# Patient Record
Sex: FEMALE | Race: WHITE | URBAN - METROPOLITAN AREA
[De-identification: names, ages, dates, MRNs, and addresses within clinical notes are randomized per-mention and may not be internally consistent; named-entity substitution may affect disease eponyms.]

---

## 2019-02-14 ENCOUNTER — OFFICE VISIT (OUTPATIENT)
Dept: CARDIOLOGY CLINIC | Age: 59
End: 2019-02-14

## 2019-02-14 VITALS — SYSTOLIC BLOOD PRESSURE: 140 MMHG | WEIGHT: 187 LBS | HEART RATE: 92 BPM | DIASTOLIC BLOOD PRESSURE: 90 MMHG

## 2019-02-14 DIAGNOSIS — Z82.49 FAMILY HISTORY OF EARLY CAD: ICD-10-CM

## 2019-02-14 DIAGNOSIS — R00.2 PALPITATIONS: ICD-10-CM

## 2019-02-14 DIAGNOSIS — R53.83 FATIGUE, UNSPECIFIED TYPE: ICD-10-CM

## 2019-02-14 DIAGNOSIS — R06.02 SHORTNESS OF BREATH: ICD-10-CM

## 2019-02-14 DIAGNOSIS — R07.9 CHEST PAIN, UNSPECIFIED TYPE: Primary | ICD-10-CM

## 2019-02-14 DIAGNOSIS — E78.5 DYSLIPIDEMIA: ICD-10-CM

## 2019-02-14 DIAGNOSIS — I10 ESSENTIAL HYPERTENSION: ICD-10-CM

## 2019-02-14 DIAGNOSIS — I49.9 IRREGULAR HEART BEAT: ICD-10-CM

## 2019-02-14 RX ORDER — ESTRADIOL 1 MG/1
1 TABLET ORAL DAILY
Refills: 3 | COMMUNITY
Start: 2018-11-30

## 2019-02-14 RX ORDER — ASPIRIN 81 MG/1
81 TABLET ORAL DAILY
COMMUNITY

## 2019-02-14 RX ORDER — PANTOPRAZOLE SODIUM 40 MG/1
1 TABLET, DELAYED RELEASE ORAL DAILY
Refills: 3 | COMMUNITY
Start: 2018-12-10

## 2019-02-14 NOTE — PATIENT INSTRUCTIONS
If your stress test is ok, please consider having a coronary calcium scan (heart scan) done to look for evidence of early plaque in the arteries of your heart. You should call [phone] to schedule this. This test is not covered by insurance and will cost you approximately $99 in February. The price goes up to $129 in March. Please let the imaging center know that you are a patient of Dr. Wally Sevilla so they can send her the results. Please check your blood pressure daily and keep a written record of your blood pressure readings. Please call the office if your systolic blood pressure is consistently greater than [id number] or your diastolic blood pressure is consistently greater than 100mmHg.      Please begin exercising at least 30 minutes/day and limit your sodium intake to 2500mg daily

## 2019-02-14 NOTE — PROGRESS NOTES
[facility]  [phone]    Reason for consult: chest pain  Requesting physician: self referred    HPI: Joana Trujillo, a 60y.o. year-old who presents for evaluation of chest pain. Dr. Craige Bence is her PCP, has only seen her twice   She has been having episodes of left sided jaw, neck and shoulder tightness for the last several months, not exertional, it is fairly constant   Left arm weakness for a while but thinks it may be related to the plate in her neck, weakness is fairly constant   Has heart racing, more at night but can have them during the day, episodes improve with taking deep breaths, episodes can last a few seconds but can last several minutes sometimes too, associated with some near syncope  Dyspnea with exertion x 6 months, fatigue x 6 months   Has had some PND, sleeps on 2 pillows due to GERD  EGD a few months ago ok   Some mild LE edema  Not exercising outside of work  [facility] and her BP was 150/100, told her to follow up regarding her HTN  Has had dyslipidemia since childhood, was told to watch her diet and exercise    **Labs received following patient's office visit  Labs dated 4/23/18  CBC ok, vit D level low at 20  , , , HDL 53  TSH 1.6, CMP ok      Assessment/Plan:  1. Chest pain - will order stress echo to assess for ischemia  -advised her to consider having a coronary calcium scan if her stress echo is negative to look for evidence of early plaque in the arteries of her heart  -just started ASA 81mg daily   2. Palpitations - will order 2 week loop monitor to assess for arrhythmias, will request recent labs from PCP office  3. HTN - advised her to begin exercising regularly, limiting her sodium intake to 2500mg daily   4. Dyslipidemia - since childhood, will request recent lipids from PCP office  5.   GERD - on PPI     Fam Hx: father had MI in his 62s and passed at age 80 and had black lung, brother had MI in his 46s and passed from Alzheimer's disease, mother passed from CVA at age 66 but had had CVAs prior to that and had COPD, sister with heart problems - details unknown  -paternal uncles with MI   Soc Hx: no tobacco use, rare etoh use     She  has a past medical history of Essential hypertension and GERD (gastroesophageal reflux disease). Cardiovascular ROS: no chest pain or dyspnea on exertion  Respiratory ROS: no cough, shortness of breath, or wheezing  Neurological ROS: no TIA or stroke symptoms  All other systems negative except as above. PE  Vitals:    02/14/19 1001   BP: 140/90   Pulse: 92   Weight: 187 lb (84.8 kg)    There is no height or weight on file to calculate BMI.    General appearance - alert, well appearing, and in no distress  Mental status - affect appropriate to mood  Eyes - sclera anicteric, moist mucous membranes  Neck - supple, no carotid bruits   Lymphatics - not assessed  Chest - clear to auscultation, no wheezes, rales or rhonchi  Heart - normal rate, regular rhythm, normal S1, S2, no murmurs, rubs, clicks or gallops  Abdomen - soft, nontender, nondistended  Back exam - full range of motion, no tenderness  Neurological - cranial nerves II through XII grossly intact, no focal deficit  Musculoskeletal - no muscular tenderness noted, normal strength  Extremities - peripheral pulses normal, no pedal edema  Skin - normal coloration  no rashes    12 lead ECG: NSR    Recent Labs:  No results found for: CHOL, CHOLX, CHLST, CHOLV, 124124, HDL, LDL, LDLC, DLDLP, TGLX, TRIGL, TRIGP, CHHD, CHHDX  No results found for: LYN, ACREA, CREA, REFC3, REFC4  No results found for: BUN, IBUN, MBUNV, BUNV  No results found for: K, KI, PLK, WBK  No results found for: HBA1C, HGBE8, ANT0HERW, KIF6MTTO  No results found for: HGB, HGBP, HGBEXT, HGBEXT  No results found for: PLT, PLTEXT, PLTEXT    Reviewed:  Past Medical History:   Diagnosis Date    Essential hypertension     GERD (gastroesophageal reflux disease) Social History     Tobacco Use   Smoking Status Never Smoker   Smokeless Tobacco Never Used     Social History     Substance and Sexual Activity   Alcohol Use Yes    Frequency: Monthly or less    Drinks per session: 1 or 2    Binge frequency: Never     Allergies   Allergen Reactions    Penicillins Hives    Tetracycline Nausea and Vomiting       Current Outpatient Medications   Medication Sig    estradiol (ESTRACE) 1 mg tablet Take 1 Tab by mouth daily.  pantoprazole (PROTONIX) 40 mg tablet Take 1 Tab by mouth daily.  aspirin delayed-release 81 mg tablet Take 81 mg by mouth daily. No current facility-administered medications for this visit.         Dez Johnson MD  [facility] [address] [place] [address] [address], [address] [facility] [address]  [facility]  [phone]

## 2019-02-20 ENCOUNTER — DOCUMENTATION ONLY (OUTPATIENT)
Dept: CARDIOLOGY CLINIC | Age: 59
End: 2019-02-20

## 2019-02-20 NOTE — PROGRESS NOTES
Got labs from PCP  Cholesterol is very high, will decide what to do after cardiac testing is complete  Vitamin D level was low, please advise her to begin Vitamin D 50,000 units once/week x 8 weeks and then take 2000 units daily after that

## 2019-02-21 ENCOUNTER — TELEPHONE (OUTPATIENT)
Dept: CARDIOLOGY CLINIC | Age: 59
End: 2019-02-21

## 2019-02-21 RX ORDER — ERGOCALCIFEROL 1.25 MG/1
50000 CAPSULE ORAL
COMMUNITY
Start: 2019-02-22 | End: 2019-02-22 | Stop reason: SDUPTHER

## 2019-02-21 RX ORDER — CHOLECALCIFEROL (VITAMIN D3) 125 MCG
CAPSULE ORAL
COMMUNITY
Start: 2019-04-13

## 2019-02-22 RX ORDER — ERGOCALCIFEROL 1.25 MG/1
50000 CAPSULE ORAL
Qty: 4 CAP | Refills: 1 | Status: SHIPPED | OUTPATIENT
Start: 2019-02-22 | End: 2019-04-19

## 2019-03-05 ENCOUNTER — TELEPHONE (OUTPATIENT)
Dept: CARDIOLOGY CLINIC | Age: 59
End: 2019-03-05

## 2019-03-05 NOTE — TELEPHONE ENCOUNTER
Patient states that her insurance has not approved the heart monitor, yet and asks if it can be held off until it is approved.    Phone: [phone]

## 2019-03-05 NOTE — TELEPHONE ENCOUNTER
Left voicemail for patient confirming that she can wait until her insurance verifies that the monitor will be covered to start wearing it. Explained that she will only be charged if she opens the box and puts it on. Told her to call back if she has any further questions.

## 2019-03-08 ENCOUNTER — TELEPHONE (OUTPATIENT)
Dept: CARDIOLOGY CLINIC | Age: 59
End: 2019-03-08

## 2019-03-08 NOTE — TELEPHONE ENCOUNTER
Patient states the only day she can do the stress echo is on 3/14/19, but there is no available appts that day. She says there is absolutely no other day she can do it and it would have to be early morning. Please advise.      Phone: [phone] or [phone]

## 2019-04-23 ENCOUNTER — DOCUMENTATION ONLY (OUTPATIENT)
Dept: CARDIOLOGY CLINIC | Age: 59
End: 2019-04-23

## 2019-04-23 NOTE — PROGRESS NOTES
Did not refill Vitamin D 50,000 units once/week today because she should be done with that    Please advise her to take Vitamin D 2000 units daily OTC now

## 2023-08-08 ENCOUNTER — HOSPITAL ENCOUNTER (OUTPATIENT)
Age: 63
Discharge: HOME | End: 2023-08-11
Payer: COMMERCIAL

## 2023-08-08 DIAGNOSIS — R53.83 FATIGUE, UNSPECIFIED TYPE: ICD-10-CM

## 2023-08-08 DIAGNOSIS — R10.11 RUQ ABDOMINAL PAIN: ICD-10-CM

## 2023-08-08 DIAGNOSIS — R19.7 DIARRHEA, UNSPECIFIED TYPE: ICD-10-CM

## 2023-08-08 DIAGNOSIS — K21.9 GASTROESOPHAGEAL REFLUX DISEASE WITHOUT ESOPHAGITIS: ICD-10-CM

## 2023-08-08 DIAGNOSIS — R11.0 NAUSEA: ICD-10-CM

## 2023-08-08 DIAGNOSIS — R10.12 LEFT UPPER QUADRANT PAIN: ICD-10-CM

## 2023-08-08 DIAGNOSIS — Z79.899 LONG TERM USE OF DRUG: ICD-10-CM

## 2023-08-08 PROCEDURE — 2500000003 HC RX 250 WO HCPCS: Performed by: RADIOLOGY

## 2023-08-08 PROCEDURE — 74177 CT ABD & PELVIS W/CONTRAST: CPT

## 2023-08-08 PROCEDURE — 6360000004 HC RX CONTRAST MEDICATION: Performed by: RADIOLOGY

## 2023-08-08 RX ADMIN — BARIUM SULFATE 450 ML: 20 SUSPENSION ORAL at 08:59

## 2023-08-08 RX ADMIN — IOPAMIDOL 100 ML: 755 INJECTION, SOLUTION INTRAVENOUS at 08:59

## 2024-04-02 ENCOUNTER — TELEPHONE (OUTPATIENT)
Age: 64
End: 2024-04-02

## 2024-04-02 NOTE — TELEPHONE ENCOUNTER
LVM for Pt. To call and schedule [facility] Cardiol[facility] Stress Test, Ordered by Dr. Aris Lorenz, [facility]. Order Scanned.    Please make appt. When Pt. Calls.    Thank you!

## 2024-04-04 ENCOUNTER — TRANSCRIBE ORDERS (OUTPATIENT)
Age: 64
End: 2024-04-04

## 2024-04-04 ENCOUNTER — HOSPITAL ENCOUNTER (OUTPATIENT)
Age: 64
Discharge: HOME | End: 2024-04-04
Payer: COMMERCIAL

## 2024-04-04 DIAGNOSIS — M25.552 LEFT HIP PAIN: Primary | ICD-10-CM

## 2024-04-04 DIAGNOSIS — M25.552 LEFT HIP PAIN: ICD-10-CM

## 2024-04-04 PROCEDURE — 73503 X-RAY EXAM HIP UNI 4/> VIEWS: CPT

## 2024-08-19 ENCOUNTER — HOSPITAL ENCOUNTER (OUTPATIENT)
Facility: HOSPITAL | Age: 64
Discharge: HOME | End: 2024-08-22
Payer: COMMERCIAL

## 2024-08-19 DIAGNOSIS — R30.0 DYSURIA: ICD-10-CM

## 2024-08-19 PROCEDURE — 74176 CT ABD & PELVIS W/O CONTRAST: CPT

## 2025-06-09 ENCOUNTER — TRANSCRIBE ORDERS (OUTPATIENT)
Facility: HOSPITAL | Age: 65
End: 2025-06-09

## 2025-06-09 DIAGNOSIS — M79.621 AXILLARY PAIN, RIGHT: Primary | ICD-10-CM
